# Patient Record
Sex: FEMALE | Race: WHITE | HISPANIC OR LATINO
[De-identification: names, ages, dates, MRNs, and addresses within clinical notes are randomized per-mention and may not be internally consistent; named-entity substitution may affect disease eponyms.]

---

## 2020-04-26 ENCOUNTER — MESSAGE (OUTPATIENT)
Age: 25
End: 2020-04-26

## 2020-05-03 LAB
SARS-COV-2 IGG SERPL IA-ACNC: 4.1 INDEX
SARS-COV-2 IGG SERPL QL IA: POSITIVE

## 2020-07-07 ENCOUNTER — TRANSCRIPTION ENCOUNTER (OUTPATIENT)
Age: 25
End: 2020-07-07

## 2020-10-25 ENCOUNTER — TRANSCRIPTION ENCOUNTER (OUTPATIENT)
Age: 25
End: 2020-10-25

## 2021-07-08 ENCOUNTER — NON-APPOINTMENT (OUTPATIENT)
Age: 26
End: 2021-07-08

## 2021-07-16 ENCOUNTER — APPOINTMENT (OUTPATIENT)
Dept: PLASTIC SURGERY | Facility: CLINIC | Age: 26
End: 2021-07-16
Payer: SELF-PAY

## 2021-07-16 DIAGNOSIS — L98.8 OTHER SPECIFIED DISORDERS OF THE SKIN AND SUBCUTANEOUS TISSUE: ICD-10-CM

## 2021-07-16 DIAGNOSIS — K13.0 DISEASES OF LIPS: ICD-10-CM

## 2021-07-16 PROBLEM — Z00.00 ENCOUNTER FOR PREVENTIVE HEALTH EXAMINATION: Status: ACTIVE | Noted: 2021-07-16

## 2021-07-16 PROCEDURE — 11950 SUBQ NJX FILLING MATRL 1CC/<: CPT

## 2021-07-16 RX ORDER — NORETHINDRONE ACETATE AND ETHINYL ESTRADIOL AND FERROUS FUMARATE 1MG-20(24)
1-20 KIT ORAL
Refills: 0 | Status: ACTIVE | COMMUNITY

## 2021-07-19 PROBLEM — K13.0 ATROPHY OF LIP: Status: ACTIVE | Noted: 2021-07-19

## 2021-07-19 PROBLEM — L98.8 FACIAL RHYTIDS: Status: ACTIVE | Noted: 2021-07-19

## 2021-07-19 NOTE — ASSESSMENT
[FreeTextEntry1] : SHELLY BECERRILCARSONDOMO  was here for procedural Botox and restylane silk injection.  She  tolerated the procedure well and will return for next dose in 4-5 months.  Instructions were reviewed. SHELLY tolerated the procedure well.

## 2021-07-19 NOTE — PROCEDURE
[FreeTextEntry1] : atrophic lips and rhytids  [FreeTextEntry6] : SHELLY BETANCOURT  is 26 year  female  complaining of having narrow atrophic lips The risks, benefits, alternatives, limitations and the permanent scars were outlined with the patient.  Under aseptic conditions and cold application, Restylane Silk administered via 30 g needle to upper and lower lips.  Good shape and contour noted with good symmetrical result . Cold compresses applied and instructions reviewed.\par SHELLY BETANCOURT is complaining of dynamic rhytids of the face and desires botulinum toxin for temporary reduction in these rhytids.  The risks benefits alternatives limitations and permanent scars were outlined with her . Under aseptic conditions, Botulinum Toxin was administered in the desired area. Please see face sheet for lot , site and dose information. \par \par Please see the scanned face sheet for lot and dose information. Aseptic administration of botox to indicated areas of the face.  See external sheet for lot and dose information

## 2022-11-18 ENCOUNTER — TRANSCRIPTION ENCOUNTER (OUTPATIENT)
Age: 27
End: 2022-11-18

## 2024-04-17 ENCOUNTER — NON-APPOINTMENT (OUTPATIENT)
Age: 29
End: 2024-04-17

## 2024-07-31 ENCOUNTER — NON-APPOINTMENT (OUTPATIENT)
Age: 29
End: 2024-07-31

## 2024-09-25 ENCOUNTER — APPOINTMENT (OUTPATIENT)
Dept: FAMILY MEDICINE | Facility: CLINIC | Age: 29
End: 2024-09-25
Payer: COMMERCIAL

## 2024-09-25 VITALS
SYSTOLIC BLOOD PRESSURE: 120 MMHG | OXYGEN SATURATION: 99 % | DIASTOLIC BLOOD PRESSURE: 80 MMHG | HEIGHT: 63 IN | BODY MASS INDEX: 20.38 KG/M2 | HEART RATE: 72 BPM | WEIGHT: 115 LBS

## 2024-09-25 DIAGNOSIS — Z80.8 FAMILY HISTORY OF MALIGNANT NEOPLASM OF OTHER ORGANS OR SYSTEMS: ICD-10-CM

## 2024-09-25 DIAGNOSIS — Z80.3 FAMILY HISTORY OF MALIGNANT NEOPLASM OF BREAST: ICD-10-CM

## 2024-09-25 DIAGNOSIS — Z11.3 ENCOUNTER FOR SCREENING FOR INFECTIONS WITH A PREDOMINANTLY SEXUAL MODE OF TRANSMISSION: ICD-10-CM

## 2024-09-25 DIAGNOSIS — Z80.49 FAMILY HISTORY OF MALIGNANT NEOPLASM OF OTHER GENITAL ORGANS: ICD-10-CM

## 2024-09-25 DIAGNOSIS — Z82.49 FAMILY HISTORY OF ISCHEMIC HEART DISEASE AND OTHER DISEASES OF THE CIRCULATORY SYSTEM: ICD-10-CM

## 2024-09-25 DIAGNOSIS — Z00.00 ENCOUNTER FOR GENERAL ADULT MEDICAL EXAMINATION W/OUT ABNORMAL FINDINGS: ICD-10-CM

## 2024-09-25 PROCEDURE — 99385 PREV VISIT NEW AGE 18-39: CPT

## 2024-09-25 PROCEDURE — 36415 COLL VENOUS BLD VENIPUNCTURE: CPT

## 2024-09-25 NOTE — HEALTH RISK ASSESSMENT
[FreeTextEntry1] : None [de-identified] : None [de-identified] : None [[id number]] : 0 [Reports changes in hearing] : Reports no changes in hearing [Reports changes in vision] : Reports no changes in vision [PapSmearComments] : Dr. Latosha Connors [facility]. Scheduled 12/24 [PapSmearDate] : 01/23 [FreeTextEntry2] : [facility] [FreeTextEntry3] : 3yo son

## 2024-09-25 NOTE — ASSESSMENT
[FreeTextEntry1] : 29 year old female presented for an annual physical exam.  routine blood work today, blood collected in the office. up to date with screening. will call back with results.

## 2024-09-25 NOTE — HISTORY OF PRESENT ILLNESS
[FreeTextEntry1] : Annual and establish care  [de-identified] : 29-year-old female with no past medical history presenting for an annual physical exam.  Patient is doing well and has no concerns today.  [facility] than [facility] years [facility]. PMH:None  PSH: None FMH: Mother - uterine cancer s/p hysterectomy. Dad - HTN, Paternal Grandmother - breast cancer 60s

## 2024-09-30 LAB
25(OH)D3 SERPL-MCNC: 28.9 NG/ML
ALBUMIN SERPL ELPH-MCNC: 4.7 G/DL
ALP BLD-CCNC: 58 U/L
ALT SERPL-CCNC: 9 U/L
ANION GAP SERPL CALC-SCNC: 15 MMOL/L
AST SERPL-CCNC: 16 U/L
BASOPHILS # BLD AUTO: 0.07 K/UL
BASOPHILS NFR BLD AUTO: 1.3 %
BILIRUB SERPL-MCNC: 0.3 MG/DL
BUN SERPL-MCNC: 12 MG/DL
C TRACH RRNA SPEC QL NAA+PROBE: NOT DETECTED
CALCIUM SERPL-MCNC: 9.2 MG/DL
CHLORIDE SERPL-SCNC: 101 MMOL/L
CHOLEST SERPL-MCNC: 206 MG/DL
CO2 SERPL-SCNC: 24 MMOL/L
CREAT SERPL-MCNC: 0.54 MG/DL
EGFR: 127 ML/MIN/1.73M2
EOSINOPHIL # BLD AUTO: 0.15 K/UL
EOSINOPHIL NFR BLD AUTO: 2.8 %
ESTIMATED AVERAGE GLUCOSE: 105 MG/DL
FOLATE SERPL-MCNC: 16.6 NG/ML
GLUCOSE SERPL-MCNC: 80 MG/DL
HBA1C MFR BLD HPLC: 5.3 %
HCT VFR BLD CALC: 42.3 %
HCV AB SER QL: NONREACTIVE
HCV S/CO RATIO: 0.15 S/CO
HDLC SERPL-MCNC: 66 MG/DL
HGB BLD-MCNC: 13.8 G/DL
HIV1+2 AB SPEC QL IA.RAPID: NONREACTIVE
IMM GRANULOCYTES NFR BLD AUTO: 0.2 %
LDLC SERPL CALC-MCNC: 125 MG/DL
LYMPHOCYTES # BLD AUTO: 2.56 K/UL
LYMPHOCYTES NFR BLD AUTO: 47.1 %
MAN DIFF?: NORMAL
MCHC RBC-ENTMCNC: 28.5 PG
MCHC RBC-ENTMCNC: 32.6 GM/DL
MCV RBC AUTO: 87.4 FL
MONOCYTES # BLD AUTO: 0.39 K/UL
MONOCYTES NFR BLD AUTO: 7.2 %
N GONORRHOEA RRNA SPEC QL NAA+PROBE: NOT DETECTED
NEUTROPHILS # BLD AUTO: 2.25 K/UL
NEUTROPHILS NFR BLD AUTO: 41.4 %
NONHDLC SERPL-MCNC: 140 MG/DL
PLATELET # BLD AUTO: 220 K/UL
POTASSIUM SERPL-SCNC: 4.3 MMOL/L
PROT SERPL-MCNC: 7.3 G/DL
RBC # BLD: 4.84 M/UL
RBC # FLD: 13.1 %
SODIUM SERPL-SCNC: 139 MMOL/L
SOURCE AMPLIFICATION: NORMAL
T PALLIDUM AB SER QL IA: NEGATIVE
TRIGL SERPL-MCNC: 85 MG/DL
TSH SERPL-ACNC: 0.91 UIU/ML
VIT B12 SERPL-MCNC: 1787 PG/ML
WBC # FLD AUTO: 5.43 K/UL

## 2024-10-01 ENCOUNTER — TRANSCRIPTION ENCOUNTER (OUTPATIENT)
Age: 29
End: 2024-10-01

## 2025-06-16 ENCOUNTER — APPOINTMENT (OUTPATIENT)
Dept: FAMILY MEDICINE | Facility: CLINIC | Age: 30
End: 2025-06-16
Payer: COMMERCIAL

## 2025-06-16 PROCEDURE — 99214 OFFICE O/P EST MOD 30 MIN: CPT | Mod: 95

## 2025-07-08 ENCOUNTER — NON-APPOINTMENT (OUTPATIENT)
Age: 30
End: 2025-07-08